# Patient Record
Sex: MALE | Race: WHITE | NOT HISPANIC OR LATINO
[De-identification: names, ages, dates, MRNs, and addresses within clinical notes are randomized per-mention and may not be internally consistent; named-entity substitution may affect disease eponyms.]

---

## 2019-07-18 ENCOUNTER — APPOINTMENT (OUTPATIENT)
Dept: CT IMAGING | Facility: CLINIC | Age: 39
End: 2019-07-18

## 2019-07-18 ENCOUNTER — OUTPATIENT (OUTPATIENT)
Dept: OUTPATIENT SERVICES | Facility: HOSPITAL | Age: 39
LOS: 1 days | End: 2019-07-18
Payer: COMMERCIAL

## 2019-07-18 PROCEDURE — 75571 CT HRT W/O DYE W/CA TEST: CPT | Mod: 26

## 2019-07-18 PROCEDURE — 75571 CT HRT W/O DYE W/CA TEST: CPT

## 2023-04-19 ENCOUNTER — APPOINTMENT (OUTPATIENT)
Dept: UROLOGY | Facility: CLINIC | Age: 43
End: 2023-04-19
Payer: COMMERCIAL

## 2023-04-19 VITALS
TEMPERATURE: 98.2 F | WEIGHT: 185 LBS | HEART RATE: 73 BPM | BODY MASS INDEX: 28.04 KG/M2 | DIASTOLIC BLOOD PRESSURE: 89 MMHG | SYSTOLIC BLOOD PRESSURE: 128 MMHG | OXYGEN SATURATION: 96 % | HEIGHT: 68 IN

## 2023-04-19 PROCEDURE — 51798 US URINE CAPACITY MEASURE: CPT

## 2023-04-19 PROCEDURE — 99204 OFFICE O/P NEW MOD 45 MIN: CPT

## 2023-04-24 NOTE — ASSESSMENT
[FreeTextEntry1] : Pt is a 41 yo M with hx elevated bladder neck s/p green light laser of bladder neck in 2016, (Dr. Huynh). \par \par Pt has minimal complaints and a benign exam.   He is emptying his bladder well on US.  He will return annually for follow-up or earlier if he develops any issues. \par \par All of his questions were answered. Patient expressed understanding.\par

## 2023-04-24 NOTE — ADDENDUM
[FreeTextEntry1] : A portion of this note was written by [Richard Patterson] on 04/19/2023 acting as a scribe for Dr. Nickerson. \par \par I have personally reviewed the chart and agree that the record accurately reflects my personal performance of the history, physical exam, assessment, and plan.\par

## 2023-04-24 NOTE — HISTORY OF PRESENT ILLNESS
[FreeTextEntry1] : Mr. BAUTISTA STALLINGS  comes in today for his urologic evaluation. Pt is a 41 yo M with hx of elevated bladder neck, s/p endoscopic resection of bladder neck by Dr. Robson Huynh in 2016.  He has been lost to follow up and presents today for a PVR.    Pt reports a good urinary stream and denies a sensation of incomplete bladder emptying.  He reports nocturia x2. \par \par Notably, he reports of a slightly high LDL, and a pending cardiac angiogram.\par \par Urine dip 4/19/23-- negative\par \par Sono (performed to assess bladder emptying): PVR 46cc\par \par PMHx: bladder outlet obstruction\par PSHx: endoscopic laser of elevated bladder neck (2016), hernia repair (2018), tonsillectomy\par SHx: non-smoker, social alcohol (wine 1-2 glasses a week)\par \par meds: probiotics, vitamins\par allergy: penicillin

## 2024-04-17 ENCOUNTER — APPOINTMENT (OUTPATIENT)
Dept: UROLOGY | Facility: CLINIC | Age: 44
End: 2024-04-17
Payer: COMMERCIAL

## 2024-04-17 VITALS
DIASTOLIC BLOOD PRESSURE: 87 MMHG | OXYGEN SATURATION: 95 % | SYSTOLIC BLOOD PRESSURE: 133 MMHG | TEMPERATURE: 97.9 F | HEART RATE: 68 BPM

## 2024-04-17 PROCEDURE — 99213 OFFICE O/P EST LOW 20 MIN: CPT

## 2024-04-17 NOTE — ADDENDUM
[FreeTextEntry1] : A portion of this note was written by Bianca Kaur on 04/17/2024 acting as a scribe for Dr. Nickerson.   I have personally reviewed the chart and agree that the record accurately reflects my personal performance of the history, physical exam, assessment, and plan.

## 2024-04-17 NOTE — HISTORY OF PRESENT ILLNESS
[FreeTextEntry1] : 04/17/2024: Mr. BAUTISTA STALLINGS is a 43 yo M with hx elevated bladder neck s/p green light laser of bladder neck in 2016, (Dr. Huynh).  Patient presents today for a follow up.   Denies any obstructive like symptoms since last visit. Nocturia x 1, usually after 6-7 hours of sleep. When he has Guinness beer, he states it stimulates some frequency. Stream is stead and strong. Denies Fhx of pros ca. Denies any changes in his health. Patient verbalizes PCP notes his health is also within good standing.   Udip: negative Sono (performed to assess bladder emptying): 123 cc PVR. Voided 30 minutes before scan, drinking 1.5 liters of water prior to visit.  Sono 2: 36cc PVR.  Mr. BAUTISTA STALLINGS comes in today for his urologic evaluation. Pt is a 43 yo M with hx of elevated bladder neck, s/p endoscopic resection of bladder neck by Dr. Robson Huynh in 2016.  He has been lost to follow up and presents today for a PVR.    Pt reports a good urinary stream and denies a sensation of incomplete bladder emptying.  He reports nocturia x2.   Notably, he reports of a slightly high LDL, and a pending cardiac angiogram.  Urine dip 4/19/23-- negative Sono (performed to assess bladder emptying): PVR 46cc PMHx: bladder outlet obstruction PSHx: endoscopic laser of elevated bladder neck (2016), hernia repair (2018), tonsillectomy SHx: non-smoker, social alcohol (wine 1-2 glasses a week)  meds: probiotics, vitamins allergy: penicillin

## 2024-04-17 NOTE — ASSESSMENT
[FreeTextEntry1] : 04/17/2024: Mr. BAUTISTA STALLINGS is a 43 yo M with hx elevated bladder neck s/p green light laser of bladder neck in 2016, (Dr. Huynh).  Patient presents today for a follow up.   After second Sono, patient emptied well.   He will return annually for follow-up exam or earlier if he develops any issues.   Patient expressed understanding.

## 2025-04-16 ENCOUNTER — APPOINTMENT (OUTPATIENT)
Dept: UROLOGY | Facility: CLINIC | Age: 45
End: 2025-04-16